# Patient Record
Sex: MALE | Race: WHITE | NOT HISPANIC OR LATINO
[De-identification: names, ages, dates, MRNs, and addresses within clinical notes are randomized per-mention and may not be internally consistent; named-entity substitution may affect disease eponyms.]

---

## 2022-08-19 ENCOUNTER — TRANSCRIBE ORDERS (OUTPATIENT)
Dept: OTHER | Age: 62
End: 2022-08-19

## 2022-08-19 ENCOUNTER — PRE VISIT (OUTPATIENT)
Dept: ONCOLOGY | Facility: CLINIC | Age: 62
End: 2022-08-19

## 2022-08-19 DIAGNOSIS — C67.9 BLADDER CANCER (H): Primary | ICD-10-CM

## 2022-08-19 NOTE — PROGRESS NOTES
Action    Action Taken 8/19/2022 1:21pm KEB  Warm Transfer from Scheduling     I spoke to Sanju and he been to the following facilities:     [facility] -Dx back in 2009/2010  - not pulling into [facility]   Originally seen at the [facility] - first biopsy     [facility] - records are in [place].     8/22/2022 7:41AM KEGLENYS   I faxed records from [facility] to [facility]      RECORDS STATUS - ALL OTHER DIAGNOSIS      RECORDS RECEIVED FROM: [facility]/ [facility]/ [facility]    DATE RECEIVED: Not Scheduled- Transferred     Action    Action Taken 8/19/2022 1:29pm KEGLENYS     I called the [facility] Phone: [phone] #0- they will fax over records from 2010- they aren't going to request his paper chart. His original biopsy was done on 7/14/2010. Records will be faxed over shortly!     I faxed over a request for imaging to       I called [facility] Ph: [phone] option 2- they will push ALL imaging from 2018-Present to  PACS      NOTES STATUS DETAILS   OFFICE NOTE from referring provider     DISCHARGE SUMMARY from hospital     [facility]  Complete I faxed records from the [facility] to [facility] on 8/22/2022 7:42AM KEB   DISCHARGE REPORT from the ER     OPERATIVE REPORT Complete See Biopsy in [facility]   CLINICAL TRIAL TREATMENTS TO DATE     LABS       PATHOLOGY REPORTS  [facility] - A.  Bladder, posterior wall, transurethral resection:   Non-invasive papillary urothelial carcinoma, low grade.   Muscularis propria is present and is uninvolved.     B.  Bladder, dome, transurethral resection:  Non-invasive   papillary urothelial carcinoma, low grade.  Muscularis   propria is present and is uninvolved.    More biopsy reports are in EPIC   ANYTHING RELATED TO DIAGNOSIS     GENONOMIC TESTING     TYPE:     IMAGING (NEED IMAGES & REPORT)     CT SCANS Requested- [facility]     Requested- [facility]  CT Angio Chest Abdomen Pelvis ([facility])     CT Chest  9/16/2021, 2/25/2021 ([facility])     CT Urogram ([facility]) 7/16/2020    MRI     MAMMO     ULTRASOUND     PET

## 2022-11-15 ENCOUNTER — OFFICE VISIT (OUTPATIENT)
Dept: ONCOLOGY | Facility: CLINIC | Age: 62
End: 2022-11-15
Attending: STUDENT IN AN ORGANIZED HEALTH CARE EDUCATION/TRAINING PROGRAM
Payer: COMMERCIAL

## 2022-11-15 VITALS
OXYGEN SATURATION: 99 % | SYSTOLIC BLOOD PRESSURE: 113 MMHG | BODY MASS INDEX: 26.32 KG/M2 | WEIGHT: 188 LBS | HEIGHT: 71 IN | RESPIRATION RATE: 18 BRPM | DIASTOLIC BLOOD PRESSURE: 64 MMHG | HEART RATE: 72 BPM

## 2022-11-15 DIAGNOSIS — C67.8 MALIGNANT NEOPLASM OF OVERLAPPING SITES OF BLADDER (H): Primary | ICD-10-CM

## 2022-11-15 DIAGNOSIS — H53.129 TRANSIENT VISUAL LOSS, UNSPECIFIED LATERALITY: ICD-10-CM

## 2022-11-15 DIAGNOSIS — M05.741 RHEUMATOID ARTHRITIS INVOLVING BOTH HANDS WITH POSITIVE RHEUMATOID FACTOR (H): ICD-10-CM

## 2022-11-15 DIAGNOSIS — M05.742 RHEUMATOID ARTHRITIS INVOLVING BOTH HANDS WITH POSITIVE RHEUMATOID FACTOR (H): ICD-10-CM

## 2022-11-15 PROCEDURE — 99204 OFFICE O/P NEW MOD 45 MIN: CPT | Performed by: STUDENT IN AN ORGANIZED HEALTH CARE EDUCATION/TRAINING PROGRAM

## 2022-11-15 PROCEDURE — G0463 HOSPITAL OUTPT CLINIC VISIT: HCPCS

## 2022-11-15 RX ORDER — FOLIC ACID 1 MG/1
1 TABLET ORAL
COMMUNITY
Start: 2022-07-14

## 2022-11-15 RX ORDER — DIAZEPAM 5 MG
5 TABLET ORAL
COMMUNITY
Start: 2022-04-14 | End: 2022-11-15

## 2022-11-15 RX ORDER — CEFAZOLIN SODIUM 2 G/100ML
2 INJECTION, SOLUTION INTRAVENOUS
Status: CANCELLED | OUTPATIENT
Start: 2022-11-15

## 2022-11-15 RX ORDER — CEFAZOLIN SODIUM 2 G/100ML
2 INJECTION, SOLUTION INTRAVENOUS SEE ADMIN INSTRUCTIONS
Status: CANCELLED | OUTPATIENT
Start: 2022-11-15

## 2022-11-15 RX ORDER — OXYBUTYNIN CHLORIDE 5 MG/1
TABLET ORAL
COMMUNITY
Start: 2022-04-14 | End: 2022-11-15

## 2022-11-15 ASSESSMENT — PAIN SCALES - GENERAL: PAINLEVEL: NO PAIN (0)

## 2022-11-15 NOTE — PROGRESS NOTES
Chief Complaint:    Bladder Tumor           Consult or Referral:     Mr. Sanju Xie is a 62 year old male seen at the request of Dr. Beth.         History of Present Illness:     Sajnu Xie is a 62 year old male being seen for establishing care for recurrent low-grade bladder cancer.  Duration of problem: 8 years  Previous treatments: TURBT in 2014, 18, 19 as well as 2022 April      Reviewed previous notes from Dr. Heaven Bills presents today for establishing care with us for treatment of his recurrent low-grade bladder cancer  He has had at least 4 recurrences between 2014 and 2022, the last one being in April of this year  His subsequent cystoscopy earlier in the year in September showed a possible suspicious patchy areas in the posterior wall of the bladder and was scheduled for resection over at [place] but was canceled because of his sudden loss of vision which has undergone further work-up with his primary care with an MRI of the brain  He has a known history of migraine which has occasionally resulted in temporary/transient loss of vision which then recovers spontaneously and nothing significant was found on the MRI done recently after his last episode  He has also a known history of rheumatoid arthritis currently stable and he is taking methotrexate for the same             Past Medical History:   No past medical history on file.         Past Surgical History:   No past surgical history on file.         Medications     Current Outpatient Medications   Medication     folic acid (FOLVITE) 1 MG tablet     methotrexate 2.5 MG tablet     No current facility-administered medications for this visit.            Family History:   No family history on file.         Social History:     Social History     Socioeconomic History     Marital status:      Spouse name: Not on file     Number of children: Not on file     Years of education: Not on file     Highest education level: Not on file  "  Occupational History     Not on file   Tobacco Use     Smoking status: Not on file     Smokeless tobacco: Not on file   Substance and Sexual Activity     Alcohol use: Not on file     Drug use: Not on file     Sexual activity: Not on file   Other Topics Concern     Not on file   Social History Narrative     Not on file     Social Determinants of Health     Financial Resource Strain: Not on file   Food Insecurity: Not on file   Transportation Needs: Not on file   Physical Activity: Not on file   Stress: Not on file   Social Connections: Not on file   Intimate Partner Violence: Not on file   Housing Stability: Not on file            Allergies:   Patient has no known allergies.         Review of Systems:  From intake questionnaire     Skin: negative  Eyes: negative  Ears/Nose/Throat: negative  Respiratory: No shortness of breath, dyspnea on exertion, cough, or hemoptysis  Cardiovascular: No chest pain or palpitations  Gastrointestinal: negative; no nausea/vomiting, constipation or diarrhea  Genitourinary: as per HPI  Musculoskeletal: negative  Neurologic: negative  Psychiatric: negative  Hematologic/Lymphatic/Immunologic: negative  Endocrine: negative         Physical Exam:     Patient is a 62 year old  male   Vitals: Blood pressure 113/64, pulse 72, resp. rate 18, height 1.803 m (5' 11\"), weight 85.3 kg (188 lb), SpO2 99 %.  Constitutional: Body mass index is 26.22 kg/m .  Alert, no acute distress, oriented, conversant  Eyes: no scleral icterus; extraocular muscles intact, moist conjunctivae  Neck: trachea midline, no thyromegaly  Ears/nose/mouth: throat/mouth:normal, good dentition  Respiratory: no respiratory distress, or pursed lip breathing  Cardiovascular: pulses strong and intact; no obvious jugular venous distension present  Gastrointestinal: soft, nontender, no organomegaly or masses,   Lymphatics: No inguinal adenopathy  Musculoskeletal: extremities normal, no peripheral edema  Skin: no suspicious lesions or " rashes  Neuro: Alert, oriented, speech and mentation normal  Psych: affect and mood normal, alert and oriented to person, place and time  Gait: Normal  : Deferred      Labs and Pathology:    The following labs were reviewed by me and discussed with the patient:  Surgical pathology, 2018/2019/2022 April: Abnormal: Low-grade papillary urothelial carcinoma    Creatinine: 1.03 mg/dL  PSA: Normal        Imaging:    The following imaging exams were independently viewed and interpreted by me and discussed with patient:  MRI brain: Normal  Unremarkable brain MRI. No evidence for acute intracranial process.                  Assessment and Plan:     Malignant neoplasm of overlapping sites of bladder (H)  We discussed in detail about his history of low-grade transitional cell cancer of the bladder and the fact that he has had multiple recurrences over the last 8 years the last recurrence being in 2008  He recalls receiving 1 dose of intravesical treatment with Mitomycin-C sometime ago however he has not had any adjuvant intravesical chemotherapy or immunotherapy with BCG  He had a discussion with his physicians over at [place] during his last visit for the same.  I discussed with him about the details of the procedure anticipated outcomes including possible need for hospital stay if there is a need for CBI and likelihood of complications including but not limited to urinary infections and hematuria which can be expected for up to 2 weeks  We discussed about adjuvant therapy, rationale for the same as well as anticipated surveillance protocol based on what we find at pathology  We also discussed briefly about bluelight cystoscopy and that however it can be helpful specifically in situations where we may not be seen anything on the white light cystoscopy in this scenario.      - Case Request: CYSTOSCOPY, WITH BLADDER BIOPSY and Fulguration and Possible TURBT; Standing  - Case Request: CYSTOSCOPY, WITH BLADDER BIOPSY and  Fulguration and Possible TURBT    Transient visual loss, unspecified laterality  Has been seen and evaluated by his primary care.  MRI has been done which has been normal  He tells me that it is part of his migraine that attacks when he is occasionally transiently loses vision in one of his eyes which she spontaneously recovers    Rheumatoid arthritis involving both hands with positive rheumatoid factor (H)  RA on methotrexate doing well        Plan:  Plan for cystoscopy bladder biopsy and fulguration    Orders  Orders Placed This Encounter   Procedures     Case Request: CYSTOSCOPY, WITH BLADDER BIOPSY and Fulguration and Possible TURBT       Tavares Sanchez MD  [facility]      ==========================    Additional Billing and Coding Information:  Review of external notes as documented above   Review of the result(s) of each unique test - Surgical pathology, creatinine, PSA    Independent interpretation of a test performed by another physician/other qualified health care professional (not separately reported) -       Discussion of management or test interpretation with external physician/other qualified healthcare professional/appropriate source -           30 minutes spent on the date of the encounter doing chart review, review of test results, interpretation of tests, patient visit and documentation     ==========================

## 2022-11-15 NOTE — LETTER
"    11/15/2022         RE: Sanju Xie  [address] [id number]        Dear Colleague,    Thank you for referring your patient, Sanju Xie, to the [facility]. Please see a copy of my visit note below.    Urology Rooming Note    November 15, 2022 8:35 AM   Sanju Xie is a 62 year old male who presents for:    Chief Complaint   Patient presents with     Urology     Bladder Cancer      Initial Vitals: /64   Pulse 72   Resp 18   Ht 1.803 m (5' 11\")   Wt 85.3 kg (188 lb)   SpO2 99%   BMI 26.22 kg/m   Estimated body mass index is 26.22 kg/m  as calculated from the following:    Height as of this encounter: 1.803 m (5' 11\").    Weight as of this encounter: 85.3 kg (188 lb). Body surface area is 2.07 meters squared.  No Pain (0) Comment: Data Unavailable     Allergies reviewed: Yes  Medications reviewed: Yes    Medications: Medication refills not needed today.  Pharmacy name entered into [id number] [place] [address]          Chief Complaint:    Bladder Tumor           Consult or Referral:     Mr. Sanju Xie is a 62 year old male seen at the request of Dr. Beth.         History of Present Illness:     Sanju Xie is a 62 year old male being seen for establishing care for recurrent low-grade bladder cancer.  Duration of problem: 8 years  Previous treatments: TURBT in 2014, 18, 19 as well as 2022 April      Reviewed previous notes from Dr. Heaven Bills presents today for establishing care with us for treatment of his recurrent low-grade bladder cancer  He has had at least 4 recurrences between 2014 and 2022, the last one being in April of this year  His subsequent cystoscopy earlier in the year in September showed a possible suspicious patchy areas in the posterior wall of the bladder and was scheduled for resection over at [place] but was canceled because of his sudden loss of vision which " has undergone further work-up with his primary care with an MRI of the brain  He has a known history of migraine which has occasionally resulted in temporary/transient loss of vision which then recovers spontaneously and nothing significant was found on the MRI done recently after his last episode  He has also a known history of rheumatoid arthritis currently stable and he is taking methotrexate for the same             Past Medical History:   No past medical history on file.         Past Surgical History:   No past surgical history on file.         Medications     Current Outpatient Medications   Medication     folic acid (FOLVITE) 1 MG tablet     methotrexate 2.5 MG tablet     No current facility-administered medications for this visit.            Family History:   No family history on file.         Social History:     Social History     Socioeconomic History     Marital status:      Spouse name: Not on file     Number of children: Not on file     Years of education: Not on file     Highest education level: Not on file   Occupational History     Not on file   Tobacco Use     Smoking status: Not on file     Smokeless tobacco: Not on file   Substance and Sexual Activity     Alcohol use: Not on file     Drug use: Not on file     Sexual activity: Not on file   Other Topics Concern     Not on file   Social History Narrative     Not on file     Social Determinants of Health     Financial Resource Strain: Not on file   Food Insecurity: Not on file   Transportation Needs: Not on file   Physical Activity: Not on file   Stress: Not on file   Social Connections: Not on file   Intimate Partner Violence: Not on file   Housing Stability: Not on file            Allergies:   Patient has no known allergies.         Review of Systems:  From intake questionnaire     Skin: negative  Eyes: negative  Ears/Nose/Throat: negative  Respiratory: No shortness of breath, dyspnea on exertion, cough, or hemoptysis  Cardiovascular: No  "chest pain or palpitations  Gastrointestinal: negative; no nausea/vomiting, constipation or diarrhea  Genitourinary: as per HPI  Musculoskeletal: negative  Neurologic: negative  Psychiatric: negative  Hematologic/Lymphatic/Immunologic: negative  Endocrine: negative         Physical Exam:     Patient is a 62 year old  male   Vitals: Blood pressure 113/64, pulse 72, resp. rate 18, height 1.803 m (5' 11\"), weight 85.3 kg (188 lb), SpO2 99 %.  Constitutional: Body mass index is 26.22 kg/m .  Alert, no acute distress, oriented, conversant  Eyes: no scleral icterus; extraocular muscles intact, moist conjunctivae  Neck: trachea midline, no thyromegaly  Ears/nose/mouth: throat/mouth:normal, good dentition  Respiratory: no respiratory distress, or pursed lip breathing  Cardiovascular: pulses strong and intact; no obvious jugular venous distension present  Gastrointestinal: soft, nontender, no organomegaly or masses,   Lymphatics: No inguinal adenopathy  Musculoskeletal: extremities normal, no peripheral edema  Skin: no suspicious lesions or rashes  Neuro: Alert, oriented, speech and mentation normal  Psych: affect and mood normal, alert and oriented to person, place and time  Gait: Normal  : Deferred      Labs and Pathology:    The following labs were reviewed by me and discussed with the patient:  Surgical pathology, 2018/2019/2022 April: Abnormal: Low-grade papillary urothelial carcinoma    Creatinine: 1.03 mg/dL  PSA: Normal        Imaging:    The following imaging exams were independently viewed and interpreted by me and discussed with patient:  MRI brain: Normal  Unremarkable brain MRI. No evidence for acute intracranial process.                  Assessment and Plan:     Malignant neoplasm of overlapping sites of bladder (H)  We discussed in detail about his history of low-grade transitional cell cancer of the bladder and the fact that he has had multiple recurrences over the last 8 years the last recurrence being in " 2008  He recalls receiving 1 dose of intravesical treatment with Mitomycin-C sometime ago however he has not had any adjuvant intravesical chemotherapy or immunotherapy with BCG  He had a discussion with his physicians over at [facility] during his last visit for the same.  I discussed with him about the details of the procedure anticipated outcomes including possible need for hospital stay if there is a need for CBI and likelihood of complications including but not limited to urinary infections and hematuria which can be expected for up to 2 weeks  We discussed about adjuvant therapy, rationale for the same as well as anticipated surveillance protocol based on what we find at pathology  We also discussed briefly about bluelight cystoscopy and that however it can be helpful specifically in situations where we may not be seen anything on the white light cystoscopy in this scenario.      - Case Request: CYSTOSCOPY, WITH BLADDER BIOPSY and Fulguration and Possible TURBT; Standing  - Case Request: CYSTOSCOPY, WITH BLADDER BIOPSY and Fulguration and Possible TURBT    Transient visual loss, unspecified laterality  Has been seen and evaluated by his primary care.  MRI has been done which has been normal  He tells me that it is part of his migraine that attacks when he is occasionally transiently loses vision in one of his eyes which she spontaneously recovers    Rheumatoid arthritis involving both hands with positive rheumatoid factor (H)  RA on methotrexate doing well        Plan:  Plan for cystoscopy bladder biopsy and fulguration    Orders  Orders Placed This Encounter   Procedures     Case Request: CYSTOSCOPY, WITH BLADDER BIOPSY and Fulguration and Possible TURBT       Tavares Sanchez MD  [facility]      ==========================    Additional Billing and Coding Information:  Review of external notes as documented above   Review of the result(s) of each unique test - Surgical pathology,  creatinine, PSA    Independent interpretation of a test performed by another physician/other qualified health care professional (not separately reported) -       Discussion of management or test interpretation with external physician/other qualified healthcare professional/appropriate source -           30 minutes spent on the date of the encounter doing chart review, review of test results, interpretation of tests, patient visit and documentation     ==========================      Again, thank you for allowing me to participate in the care of your patient.        Sincerely,        Tavares Sanchez MD

## 2022-11-15 NOTE — PROGRESS NOTES
"Urology Rooming Note    November 15, 2022 8:35 AM   Sanju Xie is a 62 year old male who presents for:    Chief Complaint   Patient presents with     Urology     Bladder Cancer      Initial Vitals: /64   Pulse 72   Resp 18   Ht 1.803 m (5' 11\")   Wt 85.3 kg (188 lb)   SpO2 99%   BMI 26.22 kg/m   Estimated body mass index is 26.22 kg/m  as calculated from the following:    Height as of this encounter: 1.803 m (5' 11\").    Weight as of this encounter: 85.3 kg (188 lb). Body surface area is 2.07 meters squared.  No Pain (0) Comment: Data Unavailable     Allergies reviewed: Yes  Medications reviewed: Yes    Medications: Medication refills not needed today.  Pharmacy name entered into [facility] [id number] [place] [address]      Coleen Freire LPN  "

## 2022-11-16 ENCOUNTER — HOSPITAL ENCOUNTER (OUTPATIENT)
Facility: CLINIC | Age: 62
End: 2022-11-16
Attending: STUDENT IN AN ORGANIZED HEALTH CARE EDUCATION/TRAINING PROGRAM | Admitting: STUDENT IN AN ORGANIZED HEALTH CARE EDUCATION/TRAINING PROGRAM
Payer: COMMERCIAL

## 2022-11-16 ENCOUNTER — TELEPHONE (OUTPATIENT)
Dept: UROLOGY | Facility: CLINIC | Age: 62
End: 2022-11-16

## 2022-11-16 NOTE — TELEPHONE ENCOUNTER
Spoke to patient, I had held the soonest available surgery for him 1/5. Pt said to cancel the surgery as he may go back to [facility]. He has to think about it. He will let us know